# Patient Record
Sex: MALE | URBAN - METROPOLITAN AREA
[De-identification: names, ages, dates, MRNs, and addresses within clinical notes are randomized per-mention and may not be internally consistent; named-entity substitution may affect disease eponyms.]

---

## 2023-07-30 ENCOUNTER — OUT OF OFFICE VISIT (OUTPATIENT)
Dept: URBAN - METROPOLITAN AREA MEDICAL CENTER 28 | Facility: MEDICAL CENTER | Age: 73
End: 2023-07-30
Payer: MEDICARE

## 2023-07-30 DIAGNOSIS — K57.32 CECAL DIVERTICULITIS: ICD-10-CM

## 2023-07-30 DIAGNOSIS — D64.89 ANEMIA DUE TO OTHER CAUSE: ICD-10-CM

## 2023-07-30 DIAGNOSIS — K62.5 ANAL BLEEDING: ICD-10-CM

## 2023-07-30 DIAGNOSIS — K90.0 ACD (ADULT CELIAC DISEASE): ICD-10-CM

## 2023-07-30 PROCEDURE — 99205 OFFICE O/P NEW HI 60 MIN: CPT | Performed by: INTERNAL MEDICINE

## 2023-07-31 ENCOUNTER — OUT OF OFFICE VISIT (OUTPATIENT)
Dept: URBAN - METROPOLITAN AREA MEDICAL CENTER 28 | Facility: MEDICAL CENTER | Age: 73
End: 2023-07-31
Payer: MEDICARE

## 2023-07-31 DIAGNOSIS — K90.0 ACD (ADULT CELIAC DISEASE): ICD-10-CM

## 2023-07-31 DIAGNOSIS — D64.89 ANEMIA DUE TO OTHER CAUSE: ICD-10-CM

## 2023-07-31 DIAGNOSIS — K57.32 CECAL DIVERTICULITIS: ICD-10-CM

## 2023-07-31 DIAGNOSIS — K62.5 ANAL BLEEDING: ICD-10-CM

## 2023-07-31 PROCEDURE — 99214 OFFICE O/P EST MOD 30 MIN: CPT | Performed by: PHYSICIAN ASSISTANT

## 2023-08-15 ENCOUNTER — OFFICE VISIT (OUTPATIENT)
Dept: URBAN - METROPOLITAN AREA CLINIC 96 | Facility: CLINIC | Age: 73
End: 2023-08-15
Payer: MEDICARE

## 2023-08-15 VITALS
TEMPERATURE: 97.1 F | BODY MASS INDEX: 28.1 KG/M2 | HEIGHT: 73 IN | SYSTOLIC BLOOD PRESSURE: 141 MMHG | WEIGHT: 212 LBS | DIASTOLIC BLOOD PRESSURE: 90 MMHG | HEART RATE: 97 BPM

## 2023-08-15 DIAGNOSIS — K76.0 FATTY LIVER: ICD-10-CM

## 2023-08-15 DIAGNOSIS — K76.89 HEPATIC CYST: ICD-10-CM

## 2023-08-15 DIAGNOSIS — Z86.010 HISTORY OF COLON POLYPS: ICD-10-CM

## 2023-08-15 DIAGNOSIS — E80.4 GILBERTS DISEASE: ICD-10-CM

## 2023-08-15 DIAGNOSIS — D64.9 ANEMIA, UNSPECIFIED TYPE: ICD-10-CM

## 2023-08-15 DIAGNOSIS — K57.30 ACQUIRED DIVERTICULOSIS OF COLON: ICD-10-CM

## 2023-08-15 DIAGNOSIS — K40.90 UNILATERAL INGUINAL HERNIA WITHOUT OBSTRUCTION OR GANGRENE, RECURRENCE NOT SPECIFIED: ICD-10-CM

## 2023-08-15 DIAGNOSIS — K90.0 CELIAC DISEASE: ICD-10-CM

## 2023-08-15 DIAGNOSIS — Z87.19 HISTORY OF DIVERTICULITIS: ICD-10-CM

## 2023-08-15 PROBLEM — 275551007: Status: ACTIVE | Noted: 2023-08-15

## 2023-08-15 PROBLEM — 27503000: Status: ACTIVE | Noted: 2023-08-15

## 2023-08-15 PROBLEM — 397881000: Status: ACTIVE | Noted: 2023-08-15

## 2023-08-15 PROBLEM — 271737000: Status: ACTIVE | Noted: 2023-08-15

## 2023-08-15 PROCEDURE — 99214 OFFICE O/P EST MOD 30 MIN: CPT | Performed by: INTERNAL MEDICINE

## 2023-08-15 RX ORDER — PRAVASTATIN SODIUM 80 MG/1
1 TABLET TABLET ORAL ONCE A DAY
Status: ACTIVE | COMMUNITY

## 2023-08-15 RX ORDER — ALLOPURINOL 300 MG/1
1 TABLET TABLET ORAL ONCE A DAY
Status: ACTIVE | COMMUNITY

## 2023-08-15 RX ORDER — LISINOPRIL AND HYDROCHLOROTHIAZIDE 20; 25 MG/1; MG/1
1 TABLET TABLET ORAL ONCE A DAY
Status: ACTIVE | COMMUNITY

## 2023-08-15 NOTE — PHYSICAL EXAM RECTAL:
normal tone, no external hemorrhoids, no masses palpable, no red blood, Tenderness on CONOR, Internal hemorrhoids present

## 2023-08-15 NOTE — HPI-TODAY'S VISIT:
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 admitted 7/30-31/2023 w diverticulitis w an assoc bleed.  has returned to baseline. bms are qd and nl. trying to be compliant w a gluten free diet. has no gerd

## 2023-08-16 ENCOUNTER — DASHBOARD ENCOUNTERS (OUTPATIENT)
Age: 73
End: 2023-08-16

## 2023-08-16 PROBLEM — 197321007: Status: ACTIVE | Noted: 2023-08-15

## 2023-08-16 PROBLEM — 428283002: Status: ACTIVE | Noted: 2023-08-15

## 2023-08-16 PROBLEM — 396331005: Status: ACTIVE | Noted: 2023-08-15

## 2023-08-16 PROBLEM — 85057007: Status: ACTIVE | Noted: 2023-08-16

## 2023-08-16 PROBLEM — 396232000: Status: ACTIVE | Noted: 2023-08-16

## 2023-08-16 PROBLEM — 118361000119100: Status: ACTIVE | Noted: 2023-08-15

## 2023-08-16 LAB
FERRITIN, SERUM: 41
FOLATE (FOLIC ACID), SERUM: 5.1
IRON BIND.CAP.(TIBC): 335
IRON SATURATION: 21
IRON: 70
VITAMIN B12: 188
WHITE BLOOD CELL COUNT: (no result)

## 2023-09-22 ENCOUNTER — TELEPHONE ENCOUNTER (OUTPATIENT)
Dept: URBAN - METROPOLITAN AREA CLINIC 96 | Facility: CLINIC | Age: 73
End: 2023-09-22

## 2023-09-24 ENCOUNTER — WEB ENCOUNTER (OUTPATIENT)
Dept: URBAN - METROPOLITAN AREA CLINIC 96 | Facility: CLINIC | Age: 73
End: 2023-09-24

## 2023-09-25 ENCOUNTER — LAB OUTSIDE AN ENCOUNTER (OUTPATIENT)
Dept: URBAN - METROPOLITAN AREA CLINIC 96 | Facility: CLINIC | Age: 73
End: 2023-09-25

## 2023-09-26 ENCOUNTER — OFFICE VISIT (OUTPATIENT)
Dept: URBAN - METROPOLITAN AREA MEDICAL CENTER 28 | Facility: MEDICAL CENTER | Age: 73
End: 2023-09-26
Payer: MEDICARE

## 2023-09-26 DIAGNOSIS — K55.8 ANGIODYSPLASIA OF SMALL INTESTINE: ICD-10-CM

## 2023-09-26 DIAGNOSIS — Z12.11 COLON CANCER SCREENING: ICD-10-CM

## 2023-09-26 LAB
ABSOLUTE BASOPHILS: 63
ABSOLUTE EOSINOPHILS: 88
ABSOLUTE LYMPHOCYTES: 1651
ABSOLUTE MONOCYTES: 680
ABSOLUTE NEUTROPHILS: 3818
BASOPHILS: 1
EOSINOPHILS: 1.4
FERRITIN, SERUM: 11
FOLATE (FOLIC ACID), SERUM: >24
HEMATOCRIT: 42
HEMOGLOBIN: 13.7
IRON BIND.CAP.(TIBC): 521
IRON SATURATION: 11
IRON: 58
LYMPHOCYTES: 26.2
MCH: 29.9
MCHC: 32.6
MCV: 91.7
MONOCYTES: 10.8
MPV: 10
NEUTROPHILS: 60.6
PLATELET COUNT: 298
RDW: 12
RED BLOOD CELL COUNT: 4.58
VITAMIN B12: 587
WHITE BLOOD CELL COUNT: 6.3

## 2023-09-26 PROCEDURE — 43270 EGD LESION ABLATION: CPT | Performed by: INTERNAL MEDICINE

## 2023-09-26 PROCEDURE — 44369 SMALL BOWEL ENDOSCOPY: CPT | Performed by: INTERNAL MEDICINE

## 2023-09-26 RX ORDER — LISINOPRIL AND HYDROCHLOROTHIAZIDE 20; 25 MG/1; MG/1
1 TABLET TABLET ORAL ONCE A DAY
Status: ACTIVE | COMMUNITY

## 2023-09-26 RX ORDER — ALLOPURINOL 300 MG/1
1 TABLET TABLET ORAL ONCE A DAY
Status: ACTIVE | COMMUNITY

## 2023-09-26 RX ORDER — PRAVASTATIN SODIUM 80 MG/1
1 TABLET TABLET ORAL ONCE A DAY
Status: ACTIVE | COMMUNITY

## 2024-08-29 ENCOUNTER — WEB ENCOUNTER (OUTPATIENT)
Dept: URBAN - METROPOLITAN AREA CLINIC 96 | Facility: CLINIC | Age: 74
End: 2024-08-29

## 2024-08-30 ENCOUNTER — WEB ENCOUNTER (OUTPATIENT)
Dept: URBAN - METROPOLITAN AREA CLINIC 96 | Facility: CLINIC | Age: 74
End: 2024-08-30

## 2024-09-02 ENCOUNTER — WEB ENCOUNTER (OUTPATIENT)
Dept: URBAN - METROPOLITAN AREA CLINIC 96 | Facility: CLINIC | Age: 74
End: 2024-09-02

## 2024-09-04 ENCOUNTER — WEB ENCOUNTER (OUTPATIENT)
Dept: URBAN - METROPOLITAN AREA CLINIC 96 | Facility: CLINIC | Age: 74
End: 2024-09-04

## 2024-09-17 ENCOUNTER — OFFICE VISIT (OUTPATIENT)
Dept: URBAN - METROPOLITAN AREA CLINIC 96 | Facility: CLINIC | Age: 74
End: 2024-09-17
Payer: MEDICARE

## 2024-09-17 ENCOUNTER — TELEPHONE ENCOUNTER (OUTPATIENT)
Dept: URBAN - METROPOLITAN AREA CLINIC 96 | Facility: CLINIC | Age: 74
End: 2024-09-17

## 2024-09-17 VITALS
HEIGHT: 73 IN | BODY MASS INDEX: 27.83 KG/M2 | DIASTOLIC BLOOD PRESSURE: 80 MMHG | SYSTOLIC BLOOD PRESSURE: 143 MMHG | WEIGHT: 210 LBS | HEART RATE: 73 BPM | TEMPERATURE: 97.4 F

## 2024-09-17 DIAGNOSIS — K76.0 FATTY LIVER: ICD-10-CM

## 2024-09-17 DIAGNOSIS — K57.30 ACQUIRED DIVERTICULOSIS OF COLON: ICD-10-CM

## 2024-09-17 DIAGNOSIS — E80.4 GILBERTS DISEASE: ICD-10-CM

## 2024-09-17 DIAGNOSIS — K90.0 CELIAC DISEASE: ICD-10-CM

## 2024-09-17 PROBLEM — 427199002: Status: ACTIVE | Noted: 2024-09-17

## 2024-09-17 PROBLEM — 300286002: Status: ACTIVE | Noted: 2024-09-17

## 2024-09-17 PROBLEM — 1196822009: Status: ACTIVE | Noted: 2024-09-17

## 2024-09-17 PROBLEM — 88111009: Status: ACTIVE | Noted: 2024-09-17

## 2024-09-17 PROCEDURE — 99214 OFFICE O/P EST MOD 30 MIN: CPT | Performed by: INTERNAL MEDICINE

## 2024-09-17 RX ORDER — CHOLESTYRAMINE 4 G/9G
1 PACKET MIXED WITH WATER OR NON-CARBONATED DRINK POWDER, FOR SUSPENSION ORAL ONCE A DAY
Qty: 14 | Refills: 0 | OUTPATIENT
Start: 2024-09-17

## 2024-09-17 RX ORDER — LISINOPRIL AND HYDROCHLOROTHIAZIDE 20; 25 MG/1; MG/1
1 TABLET TABLET ORAL ONCE A DAY
Status: ACTIVE | COMMUNITY

## 2024-09-17 RX ORDER — PRAVASTATIN SODIUM 80 MG/1
1 TABLET TABLET ORAL ONCE A DAY
Status: ACTIVE | COMMUNITY

## 2024-09-17 RX ORDER — METRONIDAZOLE 250 MG/1
1 TABLET TABLET ORAL THREE TIMES A DAY
Qty: 30 | Refills: 3 | OUTPATIENT
Start: 2024-09-17 | End: 2024-10-27

## 2024-09-17 RX ORDER — ALLOPURINOL 300 MG/1
1 TABLET TABLET ORAL ONCE A DAY
Status: ACTIVE | COMMUNITY

## 2024-09-17 RX ORDER — CIPROFLOXACIN 250 MG/1
1 TABLET TABLET, FILM COATED ORAL
Qty: 20 TABLET | Refills: 3 | OUTPATIENT
Start: 2024-09-17 | End: 2024-10-27

## 2024-09-17 NOTE — HPI-TODAY'S VISIT:
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  had returned to baseline. bms are gen qd and nl. trys to be compliant w a gluten free diet. recent sbbxs show no active celiac. has no gerd over labor day, developed watery diarrhea. no pain.  not ill. did take flagyl x 3 d prior for poss itis.  saw PCP. stool studies done. all negative. rxed w hyoscyamine which dec the bms. did see dr bar for IV Fe for his iron def.  no f/u labs since 6/2024.

## 2024-09-21 LAB
ABSOLUTE BASOPHILS: 51
ABSOLUTE EOSINOPHILS: 234
ABSOLUTE LYMPHOCYTES: 2088
ABSOLUTE MONOCYTES: 774
ABSOLUTE NEUTROPHILS: 4154
BASOPHILS: 0.7
CHOL/HDLC RATIO: 2.9
CHOLESTEROL, TOTAL: 157
EOSINOPHILS: 3.2
FERRITIN, SERUM: 35
HDL CHOLESTEROL: 55
HEMATOCRIT: 42.7
HEMOGLOBIN: 14.4
IRON BIND.CAP.(TIBC): 416
IRON SATURATION: 25
IRON: 102
LDL-CHOLESTEROL: 83
LYMPHOCYTES: 28.6
MCH: 32.4
MCHC: 33.7
MCV: 96.2
MONOCYTES: 10.6
MPV: 10.9
NEUTROPHILS: 56.9
NON HDL CHOLESTEROL: 102
PLATELET COUNT: 224
RDW: 13
RED BLOOD CELL COUNT: 4.44
TRIGLYCERIDES: 99
WHITE BLOOD CELL COUNT: 7.3

## 2024-09-27 ENCOUNTER — WEB ENCOUNTER (OUTPATIENT)
Dept: URBAN - METROPOLITAN AREA CLINIC 96 | Facility: CLINIC | Age: 74
End: 2024-09-27

## 2025-08-26 ENCOUNTER — APPOINTMENT (OUTPATIENT)
Dept: URBAN - METROPOLITAN AREA OTHER 4 | Facility: OTHER | Age: 75
Setting detail: DERMATOLOGY
End: 2025-08-26

## 2025-08-26 DIAGNOSIS — D485 NEOPLASM OF UNCERTAIN BEHAVIOR OF SKIN: ICD-10-CM

## 2025-08-26 DIAGNOSIS — L57.8 OTHER SKIN CHANGES DUE TO CHRONIC EXPOSURE TO NONIONIZING RADIATION: ICD-10-CM

## 2025-08-26 DIAGNOSIS — L57.0 ACTINIC KERATOSIS: ICD-10-CM

## 2025-08-26 PROBLEM — D48.5 NEOPLASM OF UNCERTAIN BEHAVIOR OF SKIN: Status: ACTIVE | Noted: 2025-08-26

## 2025-08-26 PROCEDURE — ? BIOPSY BY SHAVE METHOD

## 2025-08-26 PROCEDURE — ? COUNSELING

## 2025-08-26 PROCEDURE — ? DEFER

## 2025-08-26 ASSESSMENT — LOCATION SIMPLE DESCRIPTION DERM
LOCATION SIMPLE: SCALP
LOCATION SIMPLE: RIGHT FOREHEAD
LOCATION SIMPLE: RIGHT NOSE
LOCATION SIMPLE: LEFT CHEEK
LOCATION SIMPLE: NOSE

## 2025-08-26 ASSESSMENT — LOCATION ZONE DERM
LOCATION ZONE: SCALP
LOCATION ZONE: NOSE
LOCATION ZONE: FACE

## 2025-08-26 ASSESSMENT — LOCATION DETAILED DESCRIPTION DERM
LOCATION DETAILED: LEFT CENTRAL MALAR CHEEK
LOCATION DETAILED: RIGHT SUPERIOR FOREHEAD
LOCATION DETAILED: LEFT SUPERIOR PARIETAL SCALP
LOCATION DETAILED: RIGHT NASAL SIDEWALL
LOCATION DETAILED: RIGHT SUPERIOR MEDIAL FOREHEAD
LOCATION DETAILED: RIGHT NASAL DORSUM

## 2025-08-26 ASSESSMENT — PAIN INTENSITY VAS: HOW INTENSE IS YOUR PAIN 0 BEING NO PAIN, 10 BEING THE MOST SEVERE PAIN POSSIBLE?: NO PAIN

## 2025-08-26 ASSESSMENT — TOTAL NUMBER OF LESIONS: # OF LESIONS?: 3
